# Patient Record
Sex: MALE | Race: WHITE | NOT HISPANIC OR LATINO | Employment: FULL TIME | URBAN - METROPOLITAN AREA
[De-identification: names, ages, dates, MRNs, and addresses within clinical notes are randomized per-mention and may not be internally consistent; named-entity substitution may affect disease eponyms.]

---

## 2022-06-25 ENCOUNTER — HOSPITAL ENCOUNTER (EMERGENCY)
Facility: CLINIC | Age: 42
Discharge: HOME | End: 2022-06-25
Attending: FAMILY MEDICINE | Admitting: FAMILY MEDICINE

## 2022-06-25 VITALS
WEIGHT: 254.8 LBS | SYSTOLIC BLOOD PRESSURE: 148 MMHG | DIASTOLIC BLOOD PRESSURE: 101 MMHG | TEMPERATURE: 98.2 F | OXYGEN SATURATION: 97 % | HEART RATE: 108 BPM | RESPIRATION RATE: 18 BRPM

## 2022-06-25 DIAGNOSIS — R09.A9 FOREIGN BODY SENSATION IN EAR CANAL, RIGHT: ICD-10-CM

## 2022-06-25 PROCEDURE — 99282 EMERGENCY DEPT VISIT SF MDM: CPT | Performed by: FAMILY MEDICINE

## 2022-06-26 NOTE — ED TRIAGE NOTES
States he has a bug in his right ear.  States he can feel it moving around.      Triage Assessment     Row Name 06/25/22 2013       Triage Assessment (Adult)    Airway WDL WDL       Respiratory WDL    Respiratory WDL WDL       Skin Circulation/Temperature WDL    Skin Circulation/Temperature WDL WDL       Cardiac WDL    Cardiac WDL WDL       Cognitive/Neuro/Behavioral WDL    Cognitive/Neuro/Behavioral WDL WDL

## 2022-06-26 NOTE — DISCHARGE INSTRUCTIONS
Increase your fluid intake as this could help with drainage from the middle ear into the area behind nose and help with any sensation of pressure or decreased hearing.  A over-the-counter decongestant can also be potentially helpful for this.  There was no findings of a foreign body in that right ear at this time.

## 2022-06-26 NOTE — ED PROVIDER NOTES
History     Chief Complaint   Patient presents with     Ear Problem     HPI  Reed Gerardo is a 41 year old male who presents to the emergency room secondary to concerns of foreign body sensation in his right ear.  He states he feels like there is a bug caught in the ear canal.  He states he tried to flush it out and use a Q-tip but states it still felt like it was in there.  He states over the last several minutes now he has not felt anything moving in the ear canal.  Patient states that he was bending over to get underneath his car to do some work when he first felt the sensation in his ear.  He denies any nasal congestion or cough symptoms.  He denies any symptoms in his left ear.    Allergies:  Allergies   Allergen Reactions     No Known Allergies        Problem List:    Patient Active Problem List    Diagnosis Date Noted     Obesity 11/29/2006     Priority: Medium     Problem list name updated by automated process. Provider to review       JOINT PAIN-LOWER LEG, bilateral 11/29/2006     Priority: Medium     DOI 11-22-06 [employer]       Nonspecific (abnormal) findings on radiological and other examination of abdominal area, including retroperitoneum 01/26/2006     Priority: Medium     1/26/2006:  Calcification noted on or superimposed onto right kidney.  Will order renal US.       Tobacco use disorder 01/01/1999     Priority: Medium        Past Medical History:    History reviewed. No pertinent past medical history.    Past Surgical History:    History reviewed. No pertinent surgical history.    Family History:    No family history on file.    Social History:  Marital Status:  Single [1]  Social History     Tobacco Use     Smoking status: Former Smoker     Packs/day: 1.00     Years: 7.00     Pack years: 7.00     Types: Cigarettes   Substance Use Topics     Alcohol use: No        Medications:    No current outpatient medications on file.        Review of Systems   HENT: Positive for ear pain (Foreign  body sensation of a bug or something moving in his right ear canal.).    All other systems reviewed and are negative.      Physical Exam   BP: (!) 148/101  Pulse: 108  Temp: 98.2  F (36.8  C)  Resp: 18  Weight: 115.6 kg (254 lb 12.8 oz)  SpO2: 97 %      Physical Exam  Vitals and nursing note reviewed. Exam conducted with a chaperone present.   HENT:      Right Ear: Tympanic membrane, ear canal and external ear normal. There is no impacted cerumen.      Left Ear: Tympanic membrane, ear canal and external ear normal. There is no impacted cerumen.      Ears:      Comments: No evidence of any foreign material in either auditory canal on examination.  There is a slight dullness to the tympanic membranes bilaterally but no erythema.  No cerumen noted in either auditory canal.  Neurological:      Mental Status: He is alert.         ED Course                 Procedures            Critical Care time:  none            Assessments & Plan (with Medical Decision Making)  Foreign body sensation in the right auditory canal.  Patient felt like there was a bug in his ear canal.  Exam findings without any evidence for abnormality to the ears bilaterally.  No foreign body present.  Mild dullness appearance to the TMs bilaterally suggestive of possible middle ear fluid.  Patient encouraged to increase his fluid intake to thin secretions in the middle ear to facilitate drainage through the eustachian tube.  He could use a over-the-counter decongestant as well.  To return for any increase or worsening symptoms as needed.     I have reviewed the nursing notes.    I have reviewed the findings, diagnosis, plan and need for follow up with the patient.       Final diagnoses:   Foreign body sensation in ear canal, right       6/25/2022   [facility] EMERGENCY DEPT     Joe Giles,   06/25/22 2034